# Patient Record
Sex: FEMALE | Race: WHITE
[De-identification: names, ages, dates, MRNs, and addresses within clinical notes are randomized per-mention and may not be internally consistent; named-entity substitution may affect disease eponyms.]

---

## 2020-11-22 ENCOUNTER — HOSPITAL ENCOUNTER (EMERGENCY)
Dept: HOSPITAL 88 - ER | Age: 65
Discharge: HOME | End: 2020-11-22
Payer: COMMERCIAL

## 2020-11-22 VITALS — HEIGHT: 60 IN | BODY MASS INDEX: 29.45 KG/M2 | WEIGHT: 150 LBS

## 2020-11-22 DIAGNOSIS — K21.9: ICD-10-CM

## 2020-11-22 DIAGNOSIS — F41.9: ICD-10-CM
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
## 2020-11-22 DIAGNOSIS — I10: ICD-10-CM

## 2020-11-22 DIAGNOSIS — E78.5: ICD-10-CM
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
## 2020-11-22 DIAGNOSIS — S93.401A: Primary | ICD-10-CM

## 2020-11-22 PROCEDURE — 99283 EMERGENCY DEPT VISIT LOW MDM: CPT

## 2020-11-22 RX ADMIN — HYDROCODONE BITARTRATE AND ACETAMINOPHEN PRN EA: 7.5; 325 TABLET ORAL at 14:16

## 2020-11-22 NOTE — EMERGENCY DEPARTMENT NOTE
History of Present Illnes


History of Present Illness


Chief Complaint:  General Medicine Complaints


History of Present Illness


This is a 65 year old  female arrived to the ED with complaints of pain

or right ankle after sustaining an eversion injury while stepping off the curb 

of her block. .








Chief Complaint Comment PATIENT IN FROM HOME WITH COMPLAINTS OF RIGHT ANKLE PAIN

AND SWELLING SINCE THIS MORNING; STATES THAT SHE WAS GETTING THE MAIL OUT OF HER

YARD, STEPPED IN A HOLE AND ROLLED HER RIGHT ANKLE.  PATIENT AMBULATORY WITH A 

LIMP, APPEARS IN NO DISTRESS, RATES PAIN 9/10


Historian:  Patient


Arrival Mode:  Car


Onset (how long ago):  day(s)


Severity:  moderate


Onset quality:  sudden


Duration (how long):  hour(s)


Timing of current episode:  constant


Progression:  unchanged


Chronicity:  new


Context:  Reports trauma/injury


Relieving factors:  rest


Exacerbating factors:  movement





Past Medical/Family History


Physician Review


I have reviewed the patient's past medical and family history.  Any updates have

been documented here.





Past Medical History


Recent Fever:  No


Clinical Suspicion of Infectio:  No


New/Unexplained Change in Ment:  No


Past Medical History:  Hypertension, COPD, Hypothyroidism, Anxiety, Depression, 

GERD, Hyperlipedemia


Other Medical History:  


ANXIETY, REFLUX


Past Surgical History:  Hysterectomy, 


Other Surgery:  








Social History


Physically hurt or threatened:  No





Other


Last Tetanus:  





Review of Systems


Review of Systems


Constitutional:  Reports no symptoms


EENTM:  Reports no symptoms


Cardiovascular:  Reports no symptoms


Respiratory:  Reports no symptoms


Gastrointestinal:  Reports no symptoms


Genitourinary:  Reports no symptoms


Musculoskeletal:  Reports as per HPI, Reports joint pain, Reports joint swelling


Integumentary:  Reports no symptoms


Neurological:  Reports no symptoms


Psychological:  Reports no symptoms


Endocrine:  Reports no symptoms


Hematological/Lymphatic:  Reports no symptoms





Physical Exam


Related Data


Allergies:  


Coded Allergies:  


     codeine (Verified  Allergy, Severe, WELPS, 20)


     ibuprofen (Verified  Allergy, Intermediate, ITCHING, 20)


     Shellfish (Verified  Allergy, Mild, 20)


Triage Vital Signs





Vital Signs








  Date Time  Temp Pulse Resp B/P (MAP) Pulse Ox O2 Delivery O2 Flow Rate FiO2


 


20 13:19 97.7 55 20 147/61 99 Room Air  








Vital signs reviewed:  Yes





Physical Exam


CONSTITUTIONAL





Constitutional:  Present well-developed, Present well-nourished


HENT


HENT:  Present normocephalic, Present atraumatic, Present oropharynx 

clear/moist, Present nose normal


HENT L/R:  Present left ext ear normal, Present right ext ear normal


EYES





Eyes:  Reports PERRL, Reports conjunctivae normal


NECK


Neck:  Present ROM normal


PULMONARY


Pulmonary:  Present effort normal, Present breath sounds normal


CARDIOVASCULAR





Cardiovascular:  Present regular rhythm, Present heart sounds normal, Present 

capillary refill normal, Present normal rate


GASTROINTESTINAL





Abdominal:  Present soft, Present nontender, Present bowel sounds normal


GENITOURINARY





Genitourinary:  Present exam deferred


SKIN


Skin:  Present warm, Present dry


MUSCULOSKELETAL





Musculoskeletal:  Present tenderness, Present swelling (tenderness and swelling 

noted over lateral malleolus of right lower extremity, Capillary refill <2seco

nds and distal Sensation to light touch in tact per routine)


NEUROLOGICAL





Neurological:  Present alert, Present oriented x 3, Present no gross motor or 

sensory deficits


PSYCHOLOGICAL


Psychological:  Present mood/affect normal, Present judgement normal





Assessment & Plan


Medical Decision Making


MDM


Workup: XR Ankle


Findings: No fracture or dislocation


Presentation most consistent with Ankle Sprain.


Patient does not currently demonstrate complications of sprain such as 

compartment syndrome, arterial or nerve injury.


Disposition: Discharge. Supportive bracing provided. WBAT. RICE. Strict return 

precautions and instructions to follow up with primary MD within 24-48 hours for

further evaluation





Assessment & Plan


Final Impression:  


(1) Ankle sprain


Depart Disposition:  HOME, SELF-CARE


Last Vital Signs











  Date Time  Temp Pulse Resp B/P (MAP) Pulse Ox O2 Delivery O2 Flow Rate FiO2


 


20 13:19 97.7 55 20 147/61 99 Room Air  








Home Meds


Active Scripts


Tramadol Hcl (ULTRAM) 50 Mg Tablet, 50 MG PO Q6HR PRN for Mild Pain (1-3) or 

Fever>100.8, #14 TAB


   Prov:[facility]URN,          20


Reported Medications


Aspirin (ASPIR 81) 81 Mg Tablet.dr, 81 MG PO DAILY


   3/16/15


Buspirone Hcl (BUSPIRONE HCL) 10 Mg Tablet, 10 MG PO TID


   3/16/15


Trazodone Hcl (TRAZODONE HCL) 100 Mg Tablet, 100 MG PO BEDTIME


   3/16/15


Famotidine (FAMOTIDINE) 20 Mg Tab, 20 MG PO DAILY, #30 TAB


   3/16/15


Atenolol (ATENOLOL) 50 Mg Tablet, 25 MG PO DAILY


   3/16/15


Tramadol Hcl* (ULTRAM 50MG*) 50 Mg Tab, 50 MG PO PRN, TAB


   3/16/15


Levothyroxine Sodium (SYNTHROID) 100 Mcg Tab, 100 MCG PO DAILY, #30 TAB


   3/16/15











TRACI COBURN,             2020 13:49

## 2020-11-22 NOTE — DIAGNOSTIC IMAGING REPORT
X-ray 3 views of the ankle.



HISTORY:  Pain.    

COMPARISON: None available.

     

FINDINGS:

Bones:

No acute displaced fracture.  

Osseous alignment is within normal limits.



Joints:

The joint spaces are well-maintained.



Soft tissues:

The soft tissues appear unremarkable.



IMPRESSION: 



No acute radiographic abnormality.



Signed by: Kori Edmondson MD on 11/22/2020 2:49 PM

## 2020-11-22 NOTE — XMS REPORT
Continuity of Care Document

                             Created on: 2020



CARRIE MENARD

External Reference #: [id number]

: 1955

Sex: Female



Demographics





                          Address                   [address]

[place]  [address]

 

                          Home Phone                [phone]

 

                          Preferred Language        English

 

                          Marital Status            Unknown

 

                          Yazidi Affiliation     Unknown

 

                          Race                      Unknown

 

                          Ethnic Group              Unknown





Author





                          Author                    [facility]

 

                          Organization              [facility]

 

                          Address                   [address]

[place]  [address]



 

                          Phone                     Unavailable







Support





                Name            Relationship    Address         Phone

 

                    JAKE MENARD    PRS                 [address] [place]  [address]                     [phone]

 

                    JAKE MENARD    PRS                 [address] [place]  [address]                     [phone]

 

                    ELLE STRAUSS     PRS                 [address] [place]  [address]                     [phone]

 

                    ELLE STRAUSS     [place]  [address]                     [phone]







Care Team Providers





                    Care Team Member Name Role                Phone

 

                          Unavailable               Unavailable







Payers





           Payer Name Policy Type Policy Number Effective Date Expiration Date S

ource







Problems





           Condition Name Condition Details Condition Category Status     Onset 

Date Resolution

Date            Last Treatment Date Treating Clinician Comments        Source

 

             Disorder of thyroid gland Disorder of Thyroid Gland Problem      Ac

tive       2018-01-15 

00:00:00                                                         [facility]

 

             Generalized anxiety disorder Generalized Anxiety Disorder Problem  

    Active       

2018-01-15 00:00:00                                                     [facility]

 

       Insomnia Insomnia Problem Active 2018-01-15 00:00:00                     

        [facility]

 

       Restless legs Restless Legs Problem Active 2018-01-15 00:00:00           

                  [facility]

 

             Benign essential hypertension Benign Essential Hypertension Problem

      Active       

2018-01-15 00:00:00                                                     [facility]

 

                Chronic obstructive lung disease Chronic Obstructive Lung Diseas

e Problem         Active

           2018-01-15 00:00:00                                             [facility]

 

       Acid reflux Acid Reflux Problem Active 2018-01-15 00:00:00               

              [facility]







Allergies, Adverse Reactions, Alerts





        Allergy Name Allergy Type Status  Severity Reaction(s) Onset Date Inacti

ve Date 

Treating Clinician        Comments                  Source

 

       apple  DA     Active             2016 00:00:00                   

   [facility]

 

       Shellfish DA     Active             2016 00:00:00                

      [facility]

 

       codeine DA     Active             2016 00:00:00                  

    [facility]

 

       ibuprofen DA     Active             2016 00:00:00                

      [facility]

 

       naproxen DA     Active SV            2016 00:00:00                 

     [facility]

 

       apple  FA     Active SV            2016 00:00:00                   

   [facility]

 

       Codeine Allergy to substance Active        Hives                         

     [facility]

 

       Ibuprofen Allergy to substance Active        Hives                       

       [facility]

 

       SHELLFISH DERIVED Allergy to substance Active        Hives               

               [facility]







Social History





                Smoking Status  Start Date      Stop Date       Source

 

                Former Smoker                                   [facility]

ractice







Medications





             Ordered Medication Name Filled Medication Name Start Date   Stop Da

te    Current 

Medication? Ordering Clinician Indication Dosage     Frequency  Signature (SIG) 

Comments                  Components                Source

 

                          Ventolin 90 mcg/actuation aerosol inhaler 2 PUFFS Q4HR

S PRN Ventolin 90 

mcg/actuation aerosol inhaler 2 PUFFS Q4HRS PRN 2020 00:00:00           No

                                       

                Ventolin 90 mcg/actuation aerosol inhaler 2 PUFFS Q4HRS PRN     

                            [facility]

 

      Aspir-81 1 tab daily Aspir-81 1 tab daily             No                  

          Aspir-81 1 tab daily       

                                        [facility]

 

     B12 500 mg daily B12 500 mg daily           No                       B12 50

0 mg daily           [facility]

 

                                        budesonide-formoterol  mcg-4.5 mc

g/actuation aerosol inhaler Inhale 2 

puffs twice a day by inhalation route.  budesonide-formoterol  mcg-4.5 

mcg/actuation aerosol inhaler Inhale 2 puffs twice a day by inhalation route.   

                        

           No                                                     budesonide-for

moterol  mcg-4.5 mcg/actuation aerosol inhaler 

Inhale 2 puffs twice a day by inhalation route.                                 

        [facility]

 

                          buspirone 10 mg tablet TAKE 1 TABLET BY MOUTH TWICE DA

ADRI buspirone 10 mg tablet

TAKE 1 TABLET BY MOUTH TWICE DAILY                 No                           

           buspirone 10 mg tablet TAKE 1 

TABLET BY MOUTH TWICE DAILY                                         [facility]

 

                          cetirizine 10 mg tablet TAKE 1 TABLET BY MOUTH AT BEDT

NAILA AS NEEDED cetirizine 

10 mg tablet TAKE 1 TABLET BY MOUTH AT BEDTIME AS NEEDED                 No     

                                 

cetirizine 10 mg tablet TAKE 1 TABLET BY MOUTH AT BEDTIME AS NEEDED             

                            [facility]

 

                          famotidine 20 mg tablet Take 1 tablet twice a day by o

ral route for 90 days. 

famotidine 20 mg tablet Take 1 tablet twice a day by oral route for 90 days.    

                        

           No                               1          BID        famotidine 20 

mg tablet Take 1 tablet twice a day by oral route for

90 days.                                                    [facility]

ice

 

                          fenofibrate 160 mg tablet TAKE 1 TABLET BY MOUTH ONCE 

DAILY. fenofibrate 160 mg 

tablet TAKE 1 TABLET BY MOUTH ONCE DAILY.                 No                    

                  fenofibrate 160 mg 

tablet TAKE 1 TABLET BY MOUTH ONCE DAILY.                                       

  [facility]

 

                          gabapentin 400 mg capsule TAKE 1 CAPSULE BY MOUTH ONCE

 DAILY AS NEEDED 

gabapentin 400 mg capsule TAKE 1 CAPSULE BY MOUTH ONCE DAILY AS NEEDED          

                 No                        

                                 gabapentin 400 mg capsule TAKE 1 CAPSULE BY RITA

TH ONCE DAILY AS NEEDED                       

[facility]

 

                                        levothyroxine 125 mcg tablet TAKE 1 TABL

ET BY MOUTH ONCE DAILY AS DIRECTED FOR 

90 DAYS                                 levothyroxine 125 mcg tablet TAKE 1 TABL

ET BY MOUTH ONCE DAILY AS 

DIRECTED FOR 90 DAYS                 No                                      lev

othyroxine 125 mcg tablet TAKE 1 TABLET 

BY MOUTH ONCE DAILY AS DIRECTED FOR 90 DAYS                                     

    [facility]

 

                                        metoprolol tartrate 25 mg tablet Take 0.

5 tablets twice a day by oral route for 

90 days.                                metoprolol tartrate 25 mg tablet Take 0.

5 tablets twice a day by oral 

route for 90 days.                 No                      .5      BID     metop

rolol tartrate 25 mg tablet Take 0.5 

tablets twice a day by oral route for 90 days.                                  

       [facility]

 

       multivitamin 1 tab daily multivitamin 1 tab daily               No       

                          multivitamin 1 

tab daily                                                   [facility]

ice

 

                          rosuvastatin 5 mg tablet Take 1 tablet every day by or

al route for 90 days. 

rosuvastatin 5 mg tablet Take 1 tablet every day by oral route for 90 days.     

                                    

No                                     1            Q1D          rosuvastatin 5 

mg tablet Take 1 tablet every day by oral route for 

90 days.                                                    [facility]

ice

 

                                        trazodone 100 mg tablet TAKE 1/2 TO 1 (O

NE-HALF TO ONE) TABLET BY MOUTH AT 

BEDTIME AS NEEDED                       trazodone 100 mg tablet TAKE 1/2 TO 1 (O

NE-HALF TO ONE) TABLET

BY MOUTH AT BEDTIME AS NEEDED                 No                                

      trazodone 100 mg tablet TAKE 1/2 TO

1 (ONE-HALF TO ONE) TABLET BY MOUTH AT BEDTIME AS NEEDED                        

                 [facility]







Immunizations





           Ordered Immunization Name Filled Immunization Name Date       Status 

    Comments   Source

 

           Tdap       Tdap       2014 00:00:00 Kalina Bernard

[facility]







Vital Signs





             Vital Name   Observation Time Observation Value Comments     Source

 

             Height       2020 00:00:00 57.9 [in_i]               [facility]

 

             BP Diastolic 2020 00:00:00 66 mm[Hg]                 [facility]

 

             Height       2020 00:00:00 57.9 [in_i]               [facility]

 

             BMI (Body Mass Index) 2020 00:00:00 27.4 kg/m2               

 [facility]

 

             BP Systolic  2020 00:00:00 116 mm[Hg]                [facility]

 

             Body Weight  2020 00:00:00 130.8 [lb_av]              [facility]

 

             BP Diastolic 2019 00:00:00 68 mm[Hg]                 [facility]

 

             Height       2019 00:00:00 57.9 [in_i]               [facility]

 

             BMI (Body Mass Index) 2019 00:00:00 27.6 kg/m2               

 [facility]

 

             BP Systolic  2019 00:00:00 108 mm[Hg]                [facility]

 

             Body Weight  2019 00:00:00 131.6 [lb_av]              [facility]

 

             BP Diastolic 2019 00:00:00 70 mm[Hg]                 [facility]

 

             Height       2019 00:00:00 57.9 [in_i]               [facility]

 

             BMI (Body Mass Index) 2019 00:00:00 27.7 kg/m2               

 [facility]

 

             BP Systolic  2019 00:00:00 104 mm[Hg]                [facility]

 

             Body Weight  2019 00:00:00 132 [lb_av]               [facility]

 

             BP Diastolic 2019 00:00:00 60 mm[Hg]                 [facility]

 

             Height       2019 00:00:00 57.9 [in_i]               [facility]

 

             BMI (Body Mass Index) 2019 00:00:00 27.6 kg/m2               

 [facility]

 

             BP Systolic  2019 00:00:00 112 mm[Hg]                [facility]

 

             Body Weight  2019 00:00:00 131.6 [lb_av]              [facility]

 

             BP Diastolic 2019 00:00:00 66 mm[Hg]                 [facility]

 

             Height       2019 00:00:00 57.9 [in_i]               [facility]

 

             BMI (Body Mass Index) 2019 00:00:00 27.3 kg/m2               

 [facility]

 

             BP Systolic  2019 00:00:00 110 mm[Hg]                [facility]

 

             Body Weight  2019 00:00:00 130.2 [lb_av]              [facility]

 

             BP Diastolic 2018 00:00:00 80 mm[Hg]                 [facility]

 

             Height       2018 00:00:00 57.9 [in_i]               [facility]

 

             BMI (Body Mass Index) 2018 00:00:00 28.9 kg/m2               

 [facility]

 

             BP Systolic  2018 00:00:00 112 mm[Hg]                [facility]

 

             Body Weight  2018 00:00:00 138 [lb_av]               [facility]

 

             BP Diastolic 2018-01-15 00:00:00 80 mm[Hg]                 [facility]

 

             Height       2018-01-15 00:00:00 57.9 [in_i]               [facility]

 

             BMI (Body Mass Index) 2018-01-15 00:00:00 28.7 kg/m2               

 [facility]

 

             BP Systolic  2018-01-15 00:00:00 140 mm[Hg]                [facility]

 

             Body Weight  2018-01-15 00:00:00 137 [lb_av]               [facility]







Procedures





                Procedure       Date / Time Performed Performing Clinician [facility]

e

 

                MAMMO, screening, digital, bilateral 2019 00:00:00        

         [facility]

 

                electrocardiogram 2019 00:00:00                 [facility]

 

                MAMMO, screening, digital, bilateral 2018-01-15 00:00:00        

         [facility]

 

                Hyste[facility]Partial) 1990 00:00:00                 [facility]

 

                Caesarean Section 1983 00:00:00                 [facility]

 

                Caesarean Section 1981 00:00:00                 [facility]

 

                Caesarean Section 1980 00:00:00                 [facility]

 

                Partial Hysterectomy                                 [facility]

 

                 Delivery                                 [facility]







Plan of Care





             Planned Activity Planned Date Details      Comments     Source

 

                    Diagnostic Test Pending 2020 00:00:00 lipid panel, ser

um [code = lipid 

panel, serum]                                       [facility]

 

                    Diagnostic Test Pending 2020 00:00:00 CMP, serum or pl

asma [code = CMP, 

serum or plasma]                                    [facility]

 

                    Diagnostic Test Pending 2020 00:00:00 microalbumin/cre

atinine, mass ratio,

urine [code = microalbumin/creatinine, mass ratio, urine]                       

    [facility]

 

                    Diagnostic Test Pending 2020 00:00:00 CBC w/ auto diff

 [code = CBC w/ auto

 diff]                                              [facility]







Encounters





             Start Date/Time End Date/Time Encounter Type Admission Type [facility]   Care Department Encounter ID    Source

 

                    2020 00:00:00 2020 00:00:00 Mckenzie Clark MD: 

[address], 

[address], [place] [address], Ph. [phone]                          

       [facility] 

Medical - [facility] (WAG) 2020                  [facility]

e

 

                    2020 00:00:00 2020 00:00:00 Rolanda Otero, DO: [address], 

[address], [place] [address], Ph. [phone]                          

       [facility] 

Medical - [facility] ([facility]) [id number]                  [facility]

e

 

                    2020 00:00:00 2020 00:00:00 Thanh May

, DO: [address] [place] [address], Ph. [phone]                                

 [facility] 

[facility]           [id number]                  [facility]

 

                    2019 00:00:00 2019 00:00:00 Thanh May

, DO: [address] [place] [address], Ph. [phone]                                

 [facility] 

[facility]           [id number]                  [facility]

 

                    2019 00:00:00 2019 00:00:00 Johnathon castillo MD: [address] 

[place] [address], Ph. [phone]                       

          [facility] [id number]                  [facility]

 

                    2019 00:00:00 2019 00:00:00 Johnathon castillo MD: [address] 

[place] [address], Ph. [phone]                       

          [facility] [id number]                  [facility]

 

                    2019 00:00:00 2019 00:00:00 Johnathon castillo MD: [address] 

[place] [address], Ph. [phone]                       

          [facility] [id number]                  [facility]

 

                    2019 00:00:00 2019 00:00:00 Johnathon castillo MD: [address] 

[place] [address], Ph. [phone]                       

          [facility] [id number]                  [facility]

 

                    2018 00:00:00 2018 00:00:00 Johnathon castillo MD: [address] 

[place] [address], Ph. [phone]                       

          [facility] [id number]                  [facility]

 

                    2018-01-15 00:00:00 2018-01-15 00:00:00 Johnathon castillo MD: [address] 

[place] [address], Ph. [phone]                       

          [facility] [id number]                  [facility]







Results





           Test Description Test Time  Test Comments Results    Result Comments 

Source

 

                    Lipid 1996 panel - Serum or Plasma 2019 04:32:00   

 

                                        Test Item

 

             cholesterol, total (test code = cholesterol, total) 129 mg/dL    <2

00                       

 

             HDL cholesterol (test code = HDL cholesterol) 9 mg/dL      >50     

     L             

 

             triglycerides (test code = triglycerides) 236 mg/dL    <150        

 H             

 

                          Cholesterol in LDL [Mass/volume] in Serum or Plasma (t

est code = 2089-1) 87 

mg/dL (calc)                                                 

 

             chol/HDLC ratio (test code = chol/HDLC ratio) 14.3 (calc)  <5.0    

     H             

 

             non HDL cholesterol (test code = non HDL cholesterol) 120 mg/dL (ca

lc) <130                       





[facility] [Mass/volume] in Serum or Plasma
2019 04:32:00* 



             Test Item    Value        Reference Range Interpretation Comments

 

             T4, free (test code = T4, free) 1.8 NG/dL    0.8-1.8               

     





[facility] [Units/volume] in Serum or [id number] 
04:32:00* 



             Test Item    Value        Reference Range Interpretation Comments

 

             TSH (test code = TSH) 2.20 mIU/L   0.40-4.50                  





[facility]- US ABDOMEN [id number] 11:53:00  Name: 
CARRIE MENARD                [facility]                    : 
1955 Age/S: 64  / F         [address] [place] [address] [address]         [address]: [id number]     Loc:               [place] [address]                 Phys: Tahira Martinez MD                                                       Acct: [id number]  Di
s Date:               Status: REG CLI                                 PHONE #: [phone]
[phone]     Exam Date: 2019  1428                     FAX #: [phone]
[phone]      Reason: 070.54, B18.2, CHRONIC HEP C.                       EXAMS:     
                                          CPT CODE:      [id number] US ABDOMEN [facility]                        [address]                    EXAMINATION: Abdominal Ul
trasound               EXAM DATE: 2019.               CLINICAL HIS
TORY:070.54, B18.2, CHRONIC HEP C..               COMPARISON: None available.   
            Realtime sonographic evaluation of the abdomen was performed.       
 The liver is within normal limits in size and sonographic appearance       with
out evidence of mass or intrahepatic biliary ductal dilatation.        No focal 
abnormalities are noted.       The liver measures 15.6 mm in length.       The s
pleen is unremarkable..       The spleen measures 10.6 cm in length.       The g
allbladder is also within normal limits in appearance.  There is       no eviden
ce of cholelithiasis, gallbladder wall thickening, or       pericholecystic flui
d.         The common bile duct is within normal limits measuring 0.4 cm in AP  
     diameter.        Visualized portions of the pancreas are unremarkable.     
          The right kidney measures 8.5 cm.The left kidney measures 9.4 cm.     
   Both kidneys are within normal limits in size and sonographic       appearanc
e without evidence of mass, hydronephrosis, or calculi.                No free f
luid is present.               The aorta and IVC are unremarkable in the visuali
zed portions.                 IMPRESSION:          No sonographic abnormalities 
identified.                  ** Electronically Signed by RADHA Mroe 
**          **              on 2019 at 1153              **               
       Reported and signed by: Romy More M.D.       CC: Johnathon Huynh MD; Tahira Martinez MD                                                        
                                         Technologist: Radha Lloyd RDMS(OB)(B
R)                            Trnscb Date/Time: 2019 (5963) t.[email or web address].[email or web address]     
                   Orig Print D/T: S: 2019 (1427)     Probe:              
         PAGE  1                       Signed Report                            
   Comprehensive metabolic 2000 panel - Serum or [id number] 10:30:00* 



             Test Item    Value        Reference Range Interpretation Comments

 

             glucose (test code = glucose) 149 mg/dL    65-99        H          

   

 

             urea nitrogen (BUN) (test code = urea nitrogen (BUN)) 19 mg/dL     

7-25                       

 

             creatinine (test code = creatinine) 0.88 mg/dL   0.50-0.99         

         

 

                    eGFR non-afr. american (test code = eGFR non-afr. american) 

70 mL/min/1.73m2    > 

or = 60                                              

 

                    eGFR  (test code = eGFR african american) 82

 mL/min/1.73m2    > or 

= 60                                                 

 

             BUN/creatinine ratio (test code = BUN/creatinine ratio) not applica

ble 6-22                       

 

             sodium (test code = sodium) 141 mmol/L   135-146                   

 

 

             potassium (test code = potassium) 4.8 mmol/L   3.5-5.3             

       

 

             chloride (test code = chloride) 106 mmol/L                   

     

 

             carbon dioxide (test code = carbon dioxide) 24 mmol/L    20-32     

                 

 

             calcium (test code = calcium) 9.3 mg/dL    8.6-10.4                

   

 

             protein, total (test code = protein, total) 6.9 g/dL     6.1-8.1   

                 

 

             albumin (test code = albumin) 4.1 g/dL     3.6-5.1                 

   

 

             globulin (test code = globulin) 2.8 g/dL (calc) 1.9-3.7            

        

 

                albumin/globulin ratio (test code = albumin/globulin ratio) 1.5 

(calc)      1.0-2.5          

                                         

 

             bilirubin, total (test code = bilirubin, total) 0.3 mg/dL    0.2-1.

2                    

 

             alkaline phosphatase (test code = alkaline phosphatase) 58 U/L     

                       

 

             AST (test code = AST) 23 U/L       10-35                      

 

             ALT (test code = ALT) 15 U/L       6-29                       





[facility] [id number] 10:30:00* 



             Test Item    Value        Reference Range Interpretation Comments

 

             cholesterol, total (test code = cholesterol, total) 110 mg/dL    <2

00                       

 

             HDL cholesterol (test code = HDL cholesterol) 7 mg/dL      >50     

     L             

 

             triglycerides (test code = triglycerides) 205 mg/dL    <150        

 H             

 

                          Cholesterol in LDL [Mass/volume] in Serum or Plasma (t

est code = 2089-1) 73 

mg/dL (calc)                                                 

 

             chol/HDLC ratio (test code = chol/HDLC ratio) 15.7 (calc)  <5.0    

     H             

 

             non HDL cholesterol (test code = non HDL cholesterol) 103 mg/dL (ca

lc) <130                       





[facility]
2019 10:30:00* 



             Test Item    Value        Reference Range Interpretation Comments

 

             glucose (test code = glucose) 149 mg/dL    65-99        H          

   

 

             urea nitrogen (BUN) (test code = urea nitrogen (BUN)) 19 mg/dL     

7-25                       

 

             creatinine (test code = creatinine) 0.88 mg/dL   0.50-0.99         

         

 

                    eGFR non-afr. american (test code = eGFR non-afr. american) 

70 mL/min/1.73m2    > 

or = 60                                              

 

                    eGFR  (test code = eGFR african american) 82

 mL/min/1.73m2    > or 

= 60                                                 

 

             BUN/creatinine ratio (test code = BUN/creatinine ratio) not applica

ble 6-22                       

 

             sodium (test code = sodium) 141 mmol/L   135-146                   

 

 

             potassium (test code = potassium) 4.8 mmol/L   3.5-5.3             

       

 

             chloride (test code = chloride) 106 mmol/L                   

     

 

             carbon dioxide (test code = carbon dioxide) 24 mmol/L    20-32     

                 

 

             calcium (test code = calcium) 9.3 mg/dL    8.6-10.4                

   

 

             protein, total (test code = protein, total) 6.9 g/dL     6.1-8.1   

                 

 

             albumin (test code = albumin) 4.1 g/dL     3.6-5.1                 

   

 

             globulin (test code = globulin) 2.8 g/dL (calc) 1.9-3.7            

        

 

                albumin/globulin ratio (test code = albumin/globulin ratio) 1.5 

(calc)      1.0-2.5          

                                         

 

             bilirubin, total (test code = bilirubin, total) 0.3 mg/dL    0.2-1.

2                    

 

             alkaline phosphatase (test code = alkaline phosphatase) 58 U/L     

                       

 

             AST (test code = AST) 23 U/L       10-35                      

 

             ALT (test code = ALT) 15 U/L       6-29                       





[facility] or [id number] 10:30:00* 



             Test Item    Value        Reference Range Interpretation Comments

 

             cholesterol, total (test code = cholesterol, total) 110 mg/dL    <2

00                       

 

             HDL cholesterol (test code = HDL cholesterol) 7 mg/dL      >50     

     L             

 

             triglycerides (test code = triglycerides) 205 mg/dL    <150        

 H             

 

                          Cholesterol in LDL [Mass/volume] in Serum or Plasma (t

est code = 2089-1) 73 

mg/dL (calc)                                                 

 

             chol/HDLC ratio (test code = chol/HDLC ratio) 15.7 (calc)  <5.0    

     H             

 

             non HDL cholesterol (test code = non HDL cholesterol) 103 mg/dL (ca

lc) <130                       





[facility] in Serum or [id number] 
08:05:00* 



             Test Item    Value        Reference Range Interpretation Comments

 

             TSH (test code = TSH) 1.84 mIU/L   0.40-4.50                  





[facility]volume[facility] in Serum or [id number] 
08:05:00* 



             Test Item    Value        Reference Range Interpretation Comments

 

             TSH (test code = TSH) 1.84 mIU/L   0.40-4.50                  





[facility] - Serum or Plasma
2018 15:33:00* 



             Test Item    Value        Reference Range Interpretation Comments

 

             ALT (test code = ALT) 24 U/L       0-55                       

 

             AST (test code = AST) 26 U/L       5-34                       

 

             BUN (test code = BUN) 13.1 mg/dL   9.8-20.1                   

 

             alk phos (test code = alk phos) 81 unit/L                    

     

 

             glucose (test code = glucose) 79 mg/dL     70-99                   

   

 

             albumin (test code = albumin) 4.1 g/dL     3.5-5.0                 

   

 

             creatinine (test code = creatinine) 0.73 mg/dL   0.57-1.11         

         

 

             eGFR non- (test code = eGFR non-) >

60          >60                        

 

             total bilirubin (test code = total bilirubin) 0.5 mg/dL    0.2-1.2 

                   

 

             eGFR -  (test code = eGFR - ) >60  

        >60                        

 

             sodium (test code = sodium) 138 mEq/L    136-145                   

 

 

             potassium (test code = potassium) 4.4 mEq/L    3.5-5.1             

       

 

             chloride (test code = chloride) 104 mmol/L                   

     

 

             total protein (test code = total protein) 7.4 g/dL     6.4-8.3     

               

 

             calcium (test code = calcium) 8.9 mg/dL    8.4-10.2                

   

 

             CO2 (test code = CO2) 23.1 mmol/L  23.0-31.0                  

 

             anion gap (test code = anion gap) 11 calc                          

       





[facility] - Serum or [id number] 15:33:00* 



             Test Item    Value        Reference Range Interpretation Comments

 

             HDL (test code = HDL) 23 mg/dL     40-60        L             

 

             triglyceride (test code = triglyceride) 200 mg/dL    0-149        H

             

 

             VLDL calc. (test code = VLDL calc.) 40 mg/dL                       

         

 

             cholesterol/HDL ratio (test code = cholesterol/HDL ratio) 7.2 mg/dL

                               

 

                    non-HDL cholesterol calc. (test code = non-HDL cholesterol c

alc.) 143 mg/dL           

0-160                                                

 

             cholesterol (test code = cholesterol) 166 mg/dL    0-199           

           

 

             LDL calc. (test code = LDL calc.) 103 mg/dL    0-130               

       





[facility] [Units/volume] in Serum or [id number] 
15:33:00* 



             Test Item    Value        Reference Range Interpretation Comments

 

             TSH (test code = TSH) 4.466 uIU/mL 0.350-4.940                





[facility]